# Patient Record
(demographics unavailable — no encounter records)

---

## 2018-03-06 NOTE — RAD
CHEST TWO VIEW

3/6/18

 

HISTORY: 

Cough and dyspnea. 

 

COMPARISON:  

None.

 

FINDINGS:  

There is a left basilar air space opacity. There is a nodular density projecting in the right upper l
obe. No pneumothorax. No acute osseous abnormality. Cardiac silhouette and mediastinal contours are w
ithin normal limits. 

 

IMPRESSION:  

1.      Left basilar air space opacity concerning for infection. 

2.      Nodular density projecting in right upper lobe. Close attention on  followup radiographs afte
r treatment recommended. 

 

POS: FRANCISCO

## 2018-03-07 NOTE — HP
PR[facility] CARE [facility]  [facility]

 

CHIEF COMPLAINT:  Shortness of breath.

 

HISTORY OF PRESENT ILLNESS:  A 68-year-old male with a minimal past medical 
history, who is presenting with a chief complaint of shortness of breath.  He 
was found to be hypoxic with a pulse oximetry of 88% on room air, tachypneic, 
respirations of 22 with a low grade temperature of 101 on presentation.  In the 
emergency room, his evaluation demonstrated a left lower lobe community-
acquired pneumonia.  The patient currently at the time of my evaluation is on 
the floor and states that his breathing is somewhat better after administration 
of breathing treatments, cough medications, and empiric antibiotics in the 
emergency department.  The patient denies any prior similar issues.  States 
that his symptoms have been progressive with cough, low-grade fevers over the 
past week.  His wife was sick with similar but milder symptoms a few days 
before he got sick.

 

REVIEW OF SYSTEMS:  As per HPI.  Constitutional:  No significant weight loss or 
gain in the last 2 weeks.  Subjective fevers and chills over the last week.  
HEENT:  Positive for cough, intermittently productive.  No other congestion.  
No headaches, no dizziness, no lightheadedness.  Cardiovascular:  No chest pain 
or chest pressure.  No left-sided arm numbness or tingling.  Respiratory:  
Dyspnea on exertion with shortness of breath and cough as discussed above.  
Gastrointestinal:  No nausea, no vomiting, no abdominal pain, no issues with 
constipation or diarrhea.  The patient has retained appetite throughout all of 
these issues.  Musculoskeletal:  No new myalgias or arthralgias.

 

PAST MEDICAL HISTORY:  As per HPI.

1.  Obesity.

2.  Gastroesophageal reflux disease.

3.  Hypertension.

4.  Hyperlipidemia.

5.  History of spontaneous pneumothorax when the patient was in his 20s.

6.  Status post cholecystectomy.

7.  PTSD.

 

FAMILY HISTORY:  The patient states that he has a significant family history 
for both pulmonary and cardiovascular diseases, including emphysema and family 
members who are also tobacco smokers.

 

SOCIAL HISTORY:  The patient quit smoking more than 10 years ago.  Intermittent 
alcohol use.  No illicit drug use.  Patient is currently retired.  He is 
.  His wife would be his medical decision maker if he is unable to make 
his own.  The patient wishes to be FULL CODE at this point in time.

 

HOME MEDICATIONS:  The patient states that his wife is bringing them in the 
morning.  No changes to his regimen in the last month.

 

ALLERGIES:  PENICILLIN.

 

PHYSICAL EXAMINATION:

VITAL SIGNS:  Currently, temperature 98.1, pulse is 76, respirations 18, 
satting 96% on 2 liters nasal cannula, blood pressure 126/72.

GENERAL:  The patient is awake, alert, appropriate, reasonable historian, 
oriented x3, no acute distress, lying in the hospital bed.

HEENT:  Moist mucous membranes.  Equal ocular motions are intact.  Normocephalic
, atraumatic.

CARDIOVASCULAR:  S1, S2, soft heart.  Pulses 2+ bilateral upper extremities.  
No murmurs, rubs, or gallops.  No pitting pedal edema.

RESPIRATORY:  No wheezes, rales or rhonchi.  Coarseness in bilateral lower 
lobes.  Reasonable air movement.

ABDOMEN:  Positive bowel sounds, soft, nontender to palpation.

MUSCULOSKELETAL:  Moving all 4 extremities independently without assistance.

 

LABORATORY DATA AND IMAGING:  On 03/06/2018, chest x-ray.  Impression, "left 
basilar airspace opacity concerning for infection.  Nodular density projecting 
in the right upper lobe.  Close attention.  I will follow up radiographs after 
treatment recommended."  On 03/06/2018 labs, WBC 16.1, hemoglobin 13.9, 
hematocrit 40.2, platelets 357,000.  Sodium 138, potassium 3.8, chloride 101, 
bicarb 28, BUN 15, creatinine 0.97, glucose 121, calcium 9.1.  Total bilirubin 
0.5, AST 33, ALT 33, alkaline phosphatase 68, troponin 0.011.  Total protein 7.4
, albumin 3.9.

 

ASSESSMENT AND PLAN:  A 68-year-old male presenting with a chief complaint of 
shortness of breath.

1.  Shortness of breath and a clinical picture consistent with community-
acquired pneumonia.  Continue with empiric ceftriaxone, azithromycin with 
DuoNebs as needed.  The patient does not carry a known history of emphysema or 
chronic obstructive pulmonary disease.  The patient was hypoxic on presentation 
and appears to be somewhat improved at this point in time.  Continue 
supplemental oxygenation and supportive management with antitussives and to the 
nebulizer treatments as needed.  The patient will have a sputum culture 
obtained and a repeat CBC and BMP in the a.m.

2.  Hypertension, stable.

3.  Hyperlipidemia, stable.

4.  Diet.  As tolerated.

5.  Activity:  As tolerated.  We will likely need an ambulatory pulse oximetry 
daily.

6.  Deep venous thrombosis prophylaxis with enoxaparin.

7.  Sepsis, likely have a source of infection as pneumonia, already improved at 
the time of my evaluation.

8.  Admit inpatient medical surgical with telemetry.  The patient is FULL CODE 
as discussed above.

 

Thank you for asking me care for the patient.  Questions or concerns, contact 
me at [facility].

 

JANET

## 2018-03-08 NOTE — PDOC.PN
- Subjective


Encounter Start Date: 03/08/18


Encounter Start Time: 07:20





Pt seen for followup re: pneumonia.  Denies chest pain,.  Cough+, sputum+.  No 

fevers.  





- Objective


Resuscitation Status: 


 











Resuscitation Status           FULL:Full Resuscitation














MAR Reviewed: Yes


Vital Signs & Weight: 


 Vital Signs (12 hours)











  Temp Pulse Resp BP Pulse Ox


 


 03/08/18 11:59  98.4 F  65  16  135/79  94 L


 


 03/08/18 08:55   84  20  


 


 03/08/18 08:00  98 F  84  20   95


 


 03/08/18 07:58  98 F  71  16  153/77 H  94 L


 


 03/08/18 03:04  98.1 F  76  16  132/64  94 L








 Weight











Weight                         205 lb 11.2 oz














I&O: 


 











 03/07/18 03/08/18 03/09/18





 06:59 06:59 06:59


 


Intake Total 335  


 


Balance 335  











Result Diagrams: 


 03/08/18 05:45





 03/08/18 05:45


EKG Reviewed by me: Yes (Tele:  NSR)





Phys Exam





- Physical Examination


Obese


HEENT: PERRLA, moist MMs, sclera anicteric, oral pharynx no lesions


Neck: no nodes, no JVD, supple, full ROM


Respiratory: no rales, no rhonchi, wheezing present


Cardiovascular: RRR, no rub


Gastrointestinal: soft, non-tender, no distention, positive bowel sounds


Musculoskeletal: pulses present


Neurological: moves all 4 limbs


Psychiatric: normal affect, A&O x 3


Skin: no rash





Dx/Plan


(1) CAP (community acquired pneumonia)


Code(s): J18.9 - PNEUMONIA, UNSPECIFIED ORGANISM   Status: Acute   Comment: 

Continue IV antibiotics as below   





(2) Dyslipidemia


Code(s): E78.5 - HYPERLIPIDEMIA, UNSPECIFIED   Status: Chronic   





(3) HTN (hypertension)


Code(s): I10 - ESSENTIAL (PRIMARY) HYPERTENSION   Status: Chronic   Comment: 

Monitor vital signs, titrate antihypertensives as needed.   





(4) PTSD (post-traumatic stress disorder)


Code(s): F43.10 - POST-TRAUMATIC STRESS DISORDER, UNSPECIFIED   Status: Chronic

   Comment: Continue home medications.   





- Plan


continue antibiotics, out of bed/ambulate





* .


Try to wean pt off of oxygen.





Review of Systems





- Review of Systems


Constitutional: negative: fever, chills, sweats, weakness, malaise


Respiratory: Cough, SOB with Excertion, Sputum.  negative: Dry, Shortness of 

Breath, Hemoptysis, Pleuritic Pain, Wheezing


Cardiovascular: negative: chest pain, palpitations, orthopnea, paroxysmal 

nocturnal dyspnea, edema, light headedness


Gastrointestinal: negative: Nausea, Vomiting, Abdominal Pain, Diarrhea, 

Constipation, Melena, Hematochezia


Genitourinary: negative: Dysuria, Frequency, Incontinence, Hematuria, Retention





- Medications/Allergies


Allergies/Adverse Reactions: 


 Allergies











Allergy/AdvReac Type Severity Reaction Status Date / Time


 


Penicillins Allergy   Verified 03/07/18 00:36











Medications: 


 Current Medications





Al Hydroxide/Mg Hydroxide (Maalox)  30 ml PO Q4H PRN


   PRN Reason: Heartburn  or Indigestion


Albuterol/Ipratropium (Duoneb)  3 ml NEB [id number] PRN


   PRN Reason: SOB &/or Wheezing


   Last Admin: 03/08/18 08:55 Dose:  3 ml


Bupropion HCl (Wellbutrin Sr)  150 mg PO BID [facility]


   Last Admin: 03/08/18 08:36 Dose:  150 mg


Calcium Carbonate (Tums)  1,000 mg PO Q4H PRN


   PRN Reason: Heartburn  or Indigestion


   Last Admin: 03/08/18 04:04 Dose:  1,000 mg


Citalopram Hydrobromide (Celexa)  40 mg PO DAILY [facility]


   Last Admin: 03/08/18 08:36 Dose:  40 mg


Enoxaparin Sodium (Lovenox)  30 mg SC 0900 [facility]


   Last Admin: 03/08/18 08:37 Dose:  30 mg


Fish Oil (Fish Oil)  1,000 mg PO DAILY [facility]


   Last Admin: 03/08/18 08:37 Dose:  1,000 mg


Guaifenesin (Robitussin Sf)  100 mg PO Q6H PRN


   PRN Reason: Cough


   Last Admin: 03/08/18 08:38 Dose:  100 mg


Azithromycin 500 mg/ Sodium (Chloride)  250 mls @ 250 mls/hr IVPB 0430 [facility]


   Last Admin: 03/08/18 04:04 Dose:  250 mls


Ceftriaxone Sodium 1 gm/ (Syringe)  10 mls @ 120 mls/hr IVPB 2100 [facility]


   Last Admin: 03/07/18 21:14 Dose:  10 mls


Metoprolol Tartrate (Lopressor)  50 mg PO DAILY [facility]


   Last Admin: 03/08/18 08:38 Dose:  50 mg


Pantoprazole Sodium (Protonix)  40 mg PO DAILY [facility]


   Last Admin: 03/08/18 08:38 Dose:  40 mg

## 2018-03-09 NOTE — PDOC.PN
- Subjective


Encounter Start Date: 03/09/18


Encounter Start Time: 07:20





Pt seen for followup re: pneumonia.  Cough better, less sputum.  Feels better.





- Objective


Resuscitation Status: 


 











Resuscitation Status           FULL:Full Resuscitation














MAR Reviewed: Yes


Vital Signs & Weight: 


 Vital Signs (12 hours)











  Temp Pulse Resp BP Pulse Ox


 


 03/09/18 12:50   70  14   90 L


 


 03/09/18 11:53  98.1 F  70  16  151/78 H  93 L


 


 03/09/18 08:00  98.4 F  79  14  124/73  92 L


 


 03/09/18 07:50  97.6 F  73  14  


 


 03/09/18 06:53   73  14   93 L


 


 03/09/18 04:07  97.6 F  75  18  137/70  97








 Weight











Weight                         207 lb 1.6 oz














I&O: 


 











 03/08/18 03/09/18 03/10/18





 06:59 06:59 06:59


 


Intake Total  950 


 


Balance  950 











Result Diagrams: 


 03/09/18 05:12





 03/09/18 05:12


EKG Reviewed by me: Yes (Tele:  NSR)





Phys Exam





- Physical Examination


Obese


HEENT: moist MMs, oral pharynx no lesions


Neck: supple


Respiratory: clear to auscultation bilateral


Cardiovascular: RRR, no rub


Gastrointestinal: soft, positive bowel sounds


Musculoskeletal: pulses present


Neurological: moves all 4 limbs


Psychiatric: normal affect


Skin: no rash





Dx/Plan


(1) CAP (community acquired pneumonia)


Code(s): J18.9 - PNEUMONIA, UNSPECIFIED ORGANISM   Status: Acute   Comment: 

Switch to oral antibiotics   





(2) Dyslipidemia


Code(s): E78.5 - HYPERLIPIDEMIA, UNSPECIFIED   Status: Chronic   





(3) HTN (hypertension)


Code(s): I10 - ESSENTIAL (PRIMARY) HYPERTENSION   Status: Chronic   Comment: 

titrate antihypertensives as needed.   





(4) PTSD (post-traumatic stress disorder)


Code(s): F43.10 - POST-TRAUMATIC STRESS DISORDER, UNSPECIFIED   Status: Chronic

   Comment: Stable, on  home medications.   





- Plan


continue antibiotics, out of bed/ambulate, DVT proph w/lovenox





* .








Review of Systems





- Review of Systems


Constitutional: negative: fever, chills, sweats, weakness, malaise


Respiratory: Cough, Sputum.  negative: Dry, Shortness of Breath, Hemoptysis, 

SOB with Excertion, Pleuritic Pain, Wheezing


Cardiovascular: negative: chest pain, palpitations, orthopnea, paroxysmal 

nocturnal dyspnea, edema, light headedness


Genitourinary: negative: Dysuria, Frequency, Incontinence, Hematuria, Retention


Skin: negative: Rash, Lesions, South, Bruising





- Medications/Allergies


Allergies/Adverse Reactions: 


 Allergies











Allergy/AdvReac Type Severity Reaction Status Date / Time


 


Penicillins Allergy   Verified 03/07/18 00:36











Medications: 


 Current Medications





Al Hydroxide/Mg Hydroxide (Maalox)  30 ml PO Q4H PRN


   PRN Reason: Heartburn  or Indigestion


Albuterol/Ipratropium (Duoneb)  3 ml NEB [id number] [facility]


   Last Admin: 03/09/18 12:50 Dose:  3 ml


Benzonatate (Tessalon)  100 mg PO TID [facility]


   Last Admin: 03/08/18 20:03 Dose:  100 mg


Bupropion HCl (Wellbutrin Sr)  150 mg PO BID [facility]


   Last Admin: 03/09/18 10:47 Dose:  150 mg


Calcium Carbonate (Tums)  1,000 mg PO Q4H PRN


   PRN Reason: Heartburn  or Indigestion


   Last Admin: 03/09/18 04:54 Dose:  1,000 mg


Citalopram Hydrobromide (Celexa)  40 mg PO DAILY [facility]


   Last Admin: 03/09/18 10:48 Dose:  40 mg


Citalopram Hydrobromide (Celexa)  40 mg PO DAILY [facility]


   Last Admin: 03/09/18 10:49 Dose:  Not Given


Enoxaparin Sodium (Lovenox)  30 mg SC 0900 [facility]


   Last Admin: 03/09/18 10:50 Dose:  30 mg


Fish Oil (Fish Oil)  1,000 mg PO DAILY [facility]


   Last Admin: 03/09/18 10:48 Dose:  1,000 mg


Fish Oil (Fish Oil)  1,000 mg PO DAILY [facility]


   Last Admin: 03/09/18 10:49 Dose:  Not Given


Guaifenesin (Robitussin Sf)  100 mg PO Q6H PRN


   PRN Reason: Cough


   Last Admin: 03/08/18 08:38 Dose:  100 mg


Azithromycin 500 mg/ Sodium (Chloride)  250 mls @ 250 mls/hr IVPB 0430 [facility]


   Last Admin: 03/09/18 03:55 Dose:  250 mls


Ceftriaxone Sodium 1 gm/ (Syringe)  10 mls @ 120 mls/hr IVPB 2100 [facility]


   Last Admin: 03/08/18 20:03 Dose:  10 mls


Metoprolol Tartrate (Lopressor)  50 mg PO DAILY [facility]


   Last Admin: 03/09/18 10:48 Dose:  50 mg


Metoprolol Tartrate (Lopressor)  50 mg PO DAILY [facility]


   Last Admin: 03/09/18 10:49 Dose:  Not Given


Pantoprazole Sodium (Protonix)  40 mg PO DAILY [facility]


   Last Admin: 03/09/18 10:48 Dose:  40 mg


Sodium Chloride (Flush - Normal Saline)  10 ml IVF Q12HR [facility]


Sodium Chloride (Flush - Normal Saline)  10 ml IVF PRN PRN


   PRN Reason: Saline Flush

## 2018-03-10 NOTE — EKG
Test Reason : 

Blood Pressure : ***/*** mmHG

Vent. Rate : 104 BPM     Atrial Rate : 104 BPM

   P-R Int : 114 ms          QRS Dur : 082 ms

    QT Int : 404 ms       P-R-T Axes : 077 040 054 degrees

   QTc Int : 531 ms

 

Sinus tachycardia

Possible Left atrial enlargement

Prolonged QT

Abnormal ECG

 

Confirmed by LISBETH JOHNSON ([id number]),  LILA CAROLINA ([id number]) on 3/10/2018 6:18:46 PM

 

Referred By:             Confirmed By:LISBETH JOHNSON

## 2018-03-10 NOTE — DIS
DATE OF ADMISSION:  03/07/2018

 

DATE OF DISCHARGE:  03/10/2018

 

PRIMARY CARE PROVIDER:  [facility] [place].

 

DISCHARGE DIAGNOSIS:  Community-acquired pneumonia.

 

CONDITION OF PATIENT ON THE DAY OF DISCHARGE:  Stable.

 

HOSPITAL COURSE:  I saw Mr. Schwartz on the day of discharge.  He denies any chest pain or shortness of 
breath.  He denies any fevers or chills.  Vital signs are stable.  S1 and S2 are heard, regular.  Tommy
gs are clear to auscultation bilaterally.

 

DISCHARGE MEDICATIONS:  Cefdinir 300 mg 2 times a day for 7 more days, Celexa 40 mg daily, bupropion 
150 mg 2 times a day, fish oil 1 tablet daily, metoprolol 50 mg daily, and omeprazole 20 mg daily.

 

HOSPITAL COURSE:  Mr. Schwartz is a pleasant 68-year-old gentleman who was admitted to [facility] on 03/07/2018 for community-acquired pneumonia.  Chest x-ray done at the time of adm
ission showed left basilar airspace opacity.  He also had a nodular density projecting in the right u
pper lobe.  He will need followup radiographs through his primary care physician's office.

 

He was treated with intravenous antibiotics, subsequently stepped down to oral antibiotics.  He impro
tricia clinically and is being discharged home in a stable condition.

 

On the day of discharge, he has a white count of 11,900, hemoglobin 12.8, platelet count 394.  Sodium
 135, normal potassium, and normal creatinine.

 

Many thanks for allowing me to participate in your patient's care.  Please feel free to contact me wi
th any questions or concerns.

 

DISCHARGE DESTINATION:  Home.

 

TOTAL AMOUNT OF TIME SPENT COORDINATING THIS DISCHARGE:  33 minutes.